# Patient Record
Sex: FEMALE | Employment: FULL TIME | URBAN - METROPOLITAN AREA
[De-identification: names, ages, dates, MRNs, and addresses within clinical notes are randomized per-mention and may not be internally consistent; named-entity substitution may affect disease eponyms.]

---

## 2021-05-27 ENCOUNTER — THERAPY VISIT (OUTPATIENT)
Dept: PHYSICAL THERAPY | Facility: CLINIC | Age: 56
End: 2021-05-27
Payer: COMMERCIAL

## 2021-05-27 DIAGNOSIS — R39.15 URINARY URGENCY: Primary | ICD-10-CM

## 2021-05-27 DIAGNOSIS — M99.05 SOMATIC DYSFUNCTION OF PELVIC REGION: ICD-10-CM

## 2021-05-27 PROCEDURE — 97110 THERAPEUTIC EXERCISES: CPT | Mod: GP | Performed by: PHYSICAL THERAPIST

## 2021-05-27 PROCEDURE — 97530 THERAPEUTIC ACTIVITIES: CPT | Mod: GP | Performed by: PHYSICAL THERAPIST

## 2021-05-27 PROCEDURE — 97161 PT EVAL LOW COMPLEX 20 MIN: CPT | Mod: GP | Performed by: PHYSICAL THERAPIST

## 2021-05-27 PROCEDURE — 97112 NEUROMUSCULAR REEDUCATION: CPT | Mod: GP | Performed by: PHYSICAL THERAPIST

## 2021-05-27 NOTE — PROGRESS NOTES
Physical Therapy Initial Evaluation  Subjective:  The history is provided by the patient. No  was used.   Therapist Generated HPI Evaluation  Problem details: HPI: Urinary urgency, especially with running and in the winter.    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         Type of problem:  Incontinence and pelvic dysfunction.    This is a chronic condition.  Condition occurred with:  Insidious onset.          Since onset symptoms are unchanged.  Symptoms are exacerbated by running, sneezing, jumping, coughing, laughing and other (cold temp)        Restrictions due to condition include:  Working in normal job without restrictions.  Barriers include:  None as reported by patient.                        Objective:  System                                 Pelvic Dysfunction Evaluation:            Pelvic Clock Exam:  normal                External Assessment:  External assessment pelvic: better bearing down with cueing.  Skin Condition:  Normal    Bearing Down/Coughing:  Cystocele and rectocele      Muscle Contraction/Perineal Mobility:  Elevation and urogential triangle descent  Internal Assessment:    Sensory Exam:  Normal  Contraction/Grade:  Good squeeze, good hold with lift, repeatable (4)          SEMG Biofeedback:        Suraface electrode placement--Perianal:  B levator ani  Baseline  EMG PM:  3mV    Peak pelvic muscle contraction:  20mV, good sustained contraction    EMG interpretation to fatigue:  5-8 seconds  Position:  Supine and sittingAdditional History:  Delivery History:  Episiotomies and vaginal delivery  Number of Pregnancies: 1  Number of Live Births: 2  Caffeine Consumption:  3 cups coffee/day                     General     ROS    Assessment/Plan:    Patient is a 55 year old female with pelvic complaints.    Patient has the following significant findings with corresponding treatment plan.                Diagnosis 1:  Urinary urgency  Decreased proprioception - neuro re-education, therapeutic activities and home program  Impaired muscle performance - neuro re-education and home program  Decreased function - therapeutic activities and home program  Diagnosis 2:  Somatic dysfunction of pelvis   Decreased proprioception - neuro re-education, therapeutic activities and home program  Impaired muscle performance - neuro re-education and home program  Decreased function - therapeutic activities and home program    Therapy Evaluation Codes:   1) History comprised of:   Personal factors that impact the plan of care:      None.    Comorbidity factors that impact the plan of care are:      None.     Medications impacting care: High blood pressure.  2) Examination of Body Systems comprised of:   Body structures and functions that impact the plan of care:      Pelvis.   Activity limitations that impact the plan of care are:      Running, Frequency, Stress incontinence, Urgency and Urge incontinence.  3) Clinical presentation characteristics are:   Stable/Uncomplicated.  4) Decision-Making    Low complexity using standardized patient assessment instrument and/or measureable assessment of functional outcome.  Cumulative Therapy Evaluation is: Low complexity.    Previous and current functional limitations:  (See Goal Flow Sheet for this information)    Short term and Long term goals: (See Goal Flow  Sheet for this information)     Communication ability:  Patient appears to be able to clearly communicate and understand verbal and written communication and follow directions correctly.  Treatment Explanation - The following has been discussed with the patient:   RX ordered/plan of care  Anticipated outcomes  Possible risks and side effects  This patient would benefit from PT intervention to resume normal activities.   Rehab potential is excellent.    Frequency:  1 X a month, once daily  Duration:  for 4 months  Discharge Plan:  Achieve all LTG.  Independent in home treatment program.  Reach maximal therapeutic benefit.    Please refer to the daily flowsheet for treatment today, total treatment time and time spent performing 1:1 timed codes.

## 2021-06-28 ENCOUNTER — THERAPY VISIT (OUTPATIENT)
Dept: PHYSICAL THERAPY | Facility: CLINIC | Age: 56
End: 2021-06-28
Payer: COMMERCIAL

## 2021-06-28 DIAGNOSIS — M99.05 SOMATIC DYSFUNCTION OF PELVIC REGION: ICD-10-CM

## 2021-06-28 DIAGNOSIS — R39.15 URINARY URGENCY: ICD-10-CM

## 2021-06-28 PROCEDURE — 97112 NEUROMUSCULAR REEDUCATION: CPT | Mod: GP | Performed by: PHYSICAL THERAPIST

## 2021-06-28 PROCEDURE — 97110 THERAPEUTIC EXERCISES: CPT | Mod: GP | Performed by: PHYSICAL THERAPIST

## 2021-06-28 NOTE — PROGRESS NOTES
Subjective:  HPI  Physical Exam                    Objective:  System    Physical Exam    General     ROS    Assessment/Plan:    DISCHARGE  REPORT    Progress reporting period is from  6/28/2021.       SUBJECTIVE  Subjective changes noted by patient:  .  Subjective: Completed bladder diary. Has been doing the [employer] and has going back to the gym.  Has a little leaking when has people over.  Ususally leaks with laughing.  With running is trying not to void.      Current pain level is NA  .     Previous pain level was  NA  .   Changes in function:  Yes (See Goal flowsheet attached for changes in current functional level)  Adverse reaction to treatment or activity: None    OBJECTIVE  Changes noted in objective findings:  Yes,   Objective: Drinking 90 ounces of fluid.  In first 3 hours had almost 40 ounces of fluid.  Only up once at night or not at all.  Discussed that with the amount of fluid she is taking in she should expect to void more frequently during the day.  Added more functional strengthening for the abdomen and pelvic floor and fouced on breathing.      ASSESSMENT/PLAN  Updated problem list and treatment plan: Diagnosis 1:  urinary dysfunction  Decreased strength - therapeutic exercise, therapeutic activities and home program  Impaired muscle performance - biofeedback, neuro re-education and home program  Decreased function - therapeutic activities and home program  STG/LTGs have been met or progress has been made towards goals:  Yes (See Goal flow sheet completed today.)  Assessment of Progress: The patient's condition is improving.  The patient's condition has potential to improve.  Self Management Plans:  Patient has been instructed in a home treatment program.  I have re-evaluated this patient and find that the nature, scope, duration and intensity of the therapy is appropriate for the medical condition of the patient.  Fatmata continues to require the following intervention to meet STG and LTG's:  Patient  needs to continue to work on the home exercise program.      Recommendations:  This patient is ready to be discharged from therapy and continue their home treatment program.    Please refer to the daily flowsheet for treatment today, total treatment time and time spent performing 1:1 timed codes.